# Patient Record
Sex: FEMALE | Race: WHITE | URBAN - METROPOLITAN AREA
[De-identification: names, ages, dates, MRNs, and addresses within clinical notes are randomized per-mention and may not be internally consistent; named-entity substitution may affect disease eponyms.]

---
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
## 2021-12-13 NOTE — PROGRESS NOTES
Lucille Melendez was seen for a chemical health assessment at [facility].  The following recommendations have been made based on the information provided during the assessment interview.    Initial Service Plan    It has been recommended that client abstain from all mood altering substances.  It has been recommended that client continue with school and follow consequences with school.    At this time, treatment is not recommended but further use should be assessed and treatment recommendations should be followed.      If you have additional questions or concerns about this referral, you may contact your  at [phone].    If you have a mental health or substance abuse crisis, please utilize the following resources:      [facility]        [address] [place] [address] [place] [address]        Phone Number: [phone]      Crisis Connection Hotline - [phone] [facility]         ---